# Patient Record
Sex: FEMALE | Race: ASIAN | NOT HISPANIC OR LATINO | URBAN - METROPOLITAN AREA
[De-identification: names, ages, dates, MRNs, and addresses within clinical notes are randomized per-mention and may not be internally consistent; named-entity substitution may affect disease eponyms.]

---

## 2023-08-14 ENCOUNTER — OUTPATIENT (OUTPATIENT)
Dept: OUTPATIENT SERVICES | Facility: HOSPITAL | Age: 78
LOS: 1 days | End: 2023-08-14
Payer: COMMERCIAL

## 2023-08-14 ENCOUNTER — EMERGENCY (EMERGENCY)
Facility: HOSPITAL | Age: 78
LOS: 0 days | Discharge: HOME | End: 2023-08-14
Attending: EMERGENCY MEDICINE
Payer: SELF-PAY

## 2023-08-14 VITALS
OXYGEN SATURATION: 98 % | HEIGHT: 63 IN | DIASTOLIC BLOOD PRESSURE: 92 MMHG | SYSTOLIC BLOOD PRESSURE: 180 MMHG | HEART RATE: 90 BPM | WEIGHT: 119.93 LBS | TEMPERATURE: 99 F | RESPIRATION RATE: 18 BRPM

## 2023-08-14 DIAGNOSIS — E11.9 TYPE 2 DIABETES MELLITUS WITHOUT COMPLICATIONS: ICD-10-CM

## 2023-08-14 DIAGNOSIS — K02.9 DENTAL CARIES, UNSPECIFIED: ICD-10-CM

## 2023-08-14 DIAGNOSIS — K08.89 OTHER SPECIFIED DISORDERS OF TEETH AND SUPPORTING STRUCTURES: ICD-10-CM

## 2023-08-14 PROCEDURE — 99282 EMERGENCY DEPT VISIT SF MDM: CPT

## 2023-08-14 PROCEDURE — 99283 EMERGENCY DEPT VISIT LOW MDM: CPT

## 2023-08-14 PROCEDURE — D0220: CPT

## 2023-08-14 PROCEDURE — D0230: CPT

## 2023-08-14 PROCEDURE — D0140: CPT

## 2023-08-14 RX ORDER — AMOXICILLIN 250 MG/5ML
1 SUSPENSION, RECONSTITUTED, ORAL (ML) ORAL
Qty: 14 | Refills: 0
Start: 2023-08-14 | End: 2023-08-20

## 2023-08-14 NOTE — ED PROVIDER NOTE - PHYSICAL EXAMINATION
Gen: NAD, AOx3  Head: NCAT  HEENT: TTP tooth #17&18 with no abscess/drainage,  PERRL, oral mucosa moist, normal conjunctiva, oropharynx clear without exudate or erythema  Lung: CTAB, no respiratory distress, no wheezing, rales, rhonchi  CV: normal s1/s2, rrr, Normal perfusion, pulses 2+ throughout  MSK: No edema, no visible deformities, full range of motion in all 4 extremities  Neuro: CN II-XII grossly intact, No focal neurologic deficits  Skin: No rash   Psych: normal affect

## 2023-08-14 NOTE — ED PROVIDER NOTE - PATIENT PORTAL LINK FT
You can access the FollowMyHealth Patient Portal offered by [facility] by registering at the following website: [email or web address]. By joining [employer]’s FollowMyHealth portal, you will also be able to view your health information using other applications (apps) compatible with our system.

## 2023-08-14 NOTE — ED PROVIDER NOTE - OBJECTIVE STATEMENT
79 yo female with a pmh of dm presents c/o L lower dental pain for 2 days. pt denies any other symptoms including fevers, chill, headache, recent illness/travel, cough, abdominal pain, chest pain, or SOB.

## 2023-08-14 NOTE — ED PROVIDER NOTE - NSFOLLOWUPINSTRUCTIONS_ED_ALL_ED_FT
Please follow up with your primary care physician within 24-72 hours and return immediately if symptoms worsen.    Dental Pain    Dental pain (toothache) may be caused by many things including tooth decay (cavities or caries), abscess or infection, injury, or the reason may be unknown. Your pain may only occur when you are chewing, are exposed to hot or cold temperature, are eating or drinking sugary foods or beverages, or your pain may be constant. If you were prescribed an antibiotic medicine, finish all of it even if you start to feel better. Rinsing your mouth with salt water or applying ice to the painful area of your face may help with the pain.    SEEK IMMEDIATE MEDICAL CARE IF YOU HAVE THE FOLLOWING SYMPTOMS: unable to open mouth, trouble breathing or swallowing, fever, or swelling of the face, neck or jaw.

## 2023-08-14 NOTE — ED PROVIDER NOTE - NSFOLLOWUPCLINICS_GEN_ALL_ED_FT
[facility]  Dental  [address] [place] [id number]  Phone: [phone]  Fax:   Follow Up Time: 1-3 Days

## 2023-08-14 NOTE — ED PROVIDER NOTE - CLINICAL SUMMARY MEDICAL DECISION MAKING FREE TEXT BOX
78-year-old female with past medical history as above presents with 3 days of left lower dental pain.  On exam airway intact, no swelling, tenderness to percussion #17 and 18.  No signs of abscess.  Clinically feel stable for discharge to dental clinic.  neck supple, no submandibular erythema. no ludwigs. dental eval outpt

## 2023-08-21 DIAGNOSIS — K03.81 CRACKED TOOTH: ICD-10-CM

## 2023-09-05 ENCOUNTER — OUTPATIENT (OUTPATIENT)
Dept: OUTPATIENT SERVICES | Facility: HOSPITAL | Age: 78
LOS: 1 days | End: 2023-09-05
Payer: COMMERCIAL

## 2023-09-05 DIAGNOSIS — K02.9 DENTAL CARIES, UNSPECIFIED: ICD-10-CM

## 2023-09-05 PROCEDURE — D0140: CPT

## 2023-09-05 PROCEDURE — D0330: CPT

## 2023-09-13 ENCOUNTER — OUTPATIENT (OUTPATIENT)
Dept: OUTPATIENT SERVICES | Facility: HOSPITAL | Age: 78
LOS: 1 days | End: 2023-09-13
Payer: COMMERCIAL

## 2023-09-13 DIAGNOSIS — K02.9 DENTAL CARIES, UNSPECIFIED: ICD-10-CM

## 2023-09-13 DIAGNOSIS — K01.1 IMPACTED TEETH: ICD-10-CM

## 2023-09-13 PROCEDURE — D0120: CPT

## 2023-09-13 PROCEDURE — D7140: CPT

## 2023-09-20 ENCOUNTER — OUTPATIENT (OUTPATIENT)
Dept: OUTPATIENT SERVICES | Facility: HOSPITAL | Age: 78
LOS: 1 days | End: 2023-09-20
Payer: COMMERCIAL

## 2023-09-20 DIAGNOSIS — K01.1 IMPACTED TEETH: ICD-10-CM

## 2023-09-20 DIAGNOSIS — K02.9 DENTAL CARIES, UNSPECIFIED: ICD-10-CM

## 2023-09-20 PROCEDURE — D7140: CPT

## 2023-09-27 ENCOUNTER — OUTPATIENT (OUTPATIENT)
Dept: OUTPATIENT SERVICES | Facility: HOSPITAL | Age: 78
LOS: 1 days | End: 2023-09-27
Payer: COMMERCIAL

## 2023-09-27 DIAGNOSIS — K02.9 DENTAL CARIES, UNSPECIFIED: ICD-10-CM

## 2023-09-27 DIAGNOSIS — K01.1 IMPACTED TEETH: ICD-10-CM

## 2023-09-27 PROCEDURE — D0170: CPT

## 2023-10-04 ENCOUNTER — OUTPATIENT (OUTPATIENT)
Dept: OUTPATIENT SERVICES | Facility: HOSPITAL | Age: 78
LOS: 1 days | End: 2023-10-04
Payer: COMMERCIAL

## 2023-10-04 DIAGNOSIS — K01.1 IMPACTED TEETH: ICD-10-CM

## 2023-10-04 PROCEDURE — D0170: CPT

## 2023-10-18 DIAGNOSIS — K08.409 PARTIAL LOSS OF TEETH, UNSPECIFIED CAUSE, UNSPECIFIED CLASS: ICD-10-CM
